# Patient Record
Sex: FEMALE | Race: WHITE | NOT HISPANIC OR LATINO | Employment: FULL TIME | URBAN - METROPOLITAN AREA
[De-identification: names, ages, dates, MRNs, and addresses within clinical notes are randomized per-mention and may not be internally consistent; named-entity substitution may affect disease eponyms.]

---
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
## 2020-01-02 ENCOUNTER — TELEPHONE (OUTPATIENT)
Dept: CARDIOLOGY CLINIC | Facility: CLINIC | Age: 20
End: 2020-01-02

## 2020-01-02 NOTE — TELEPHONE ENCOUNTER
It is not likely to happen from SSRI    She is under significant medical stress as she has to stop her ongoing studies  I think that is the more common precipitator      She should be evaluated by physical therapist who does the [facility] protocol  That will give me an idea as to where she is as far as exertional ability in the [facility] [address]    In addition should continue to see the counselors as this is a particularly stressful time where her future plans are going to be affected

## 2020-01-02 NOTE — TELEPHONE ENCOUNTER
Patient's mother called on stating patient has been having worsening symptoms of nausea/headaches, tachycardia with standing/brain fog  HR's when standing go from  bpm       She was asking if this could be from patient starting Zoloft 25mg three weeks ago  In the past, the patient took Lexapro and felt the same thing happened, but does feel that she needs something to help with anxiety/depression  She also does see a counselor  Are SSRI's ok for patient's who are being treated for POTS/dysautonaomia? If not, are there meds that are safer/better?

## 2020-01-03 NOTE — TELEPHONE ENCOUNTER
Explained to mother that SSRI should not be causing worsening in symptoms  I will call and speak to patient one on one this afternoon to triage her current symptoms

## 2020-01-06 NOTE — TELEPHONE ENCOUNTER
I am going to speak directly with patient about her symptoms  I called her today  She was working and will get back to me

## 2020-01-26 DIAGNOSIS — G90.9 AUTONOMIC DISORDER: ICD-10-CM

## 2020-02-06 RX ORDER — FLUDROCORTISONE ACETATE 0.1 MG/1
TABLET ORAL
Qty: 30 TABLET | Refills: 0 | Status: SHIPPED | OUTPATIENT
Start: 2020-02-06 | End: 2020-05-19

## 2020-05-19 DIAGNOSIS — G90.9 AUTONOMIC DISORDER: ICD-10-CM

## 2020-05-19 RX ORDER — FLUDROCORTISONE ACETATE 0.1 MG/1
TABLET ORAL
Qty: 30 TABLET | Refills: 0 | Status: SHIPPED | OUTPATIENT
Start: 2020-05-19

## 2020-07-20 ENCOUNTER — OFFICE VISIT (OUTPATIENT)
Dept: URGENT CARE | Facility: CLINIC | Age: 20
End: 2020-07-20
Payer: COMMERCIAL

## 2020-07-20 VITALS
DIASTOLIC BLOOD PRESSURE: 69 MMHG | HEIGHT: 68 IN | SYSTOLIC BLOOD PRESSURE: 108 MMHG | HEART RATE: 103 BPM | BODY MASS INDEX: 19.97 KG/M2 | TEMPERATURE: 98.2 F | RESPIRATION RATE: 16 BRPM | WEIGHT: 131.8 LBS | OXYGEN SATURATION: 98 %

## 2020-07-20 DIAGNOSIS — L23.7 ALLERGIC CONTACT DERMATITIS DUE TO PLANTS, EXCEPT FOOD: Primary | ICD-10-CM

## 2020-07-20 PROCEDURE — 99213 OFFICE O/P EST LOW 20 MIN: CPT | Performed by: PHYSICIAN ASSISTANT

## 2020-07-20 RX ORDER — NORTRIPTYLINE HYDROCHLORIDE 10 MG/1
CAPSULE ORAL
COMMUNITY

## 2020-07-20 RX ORDER — MIDODRINE HYDROCHLORIDE 2.5 MG/1
2.5 TABLET ORAL 3 TIMES DAILY
COMMUNITY

## 2020-07-20 RX ORDER — PREDNISONE 10 MG/1
TABLET ORAL
Qty: 21 TABLET | Refills: 0 | Status: SHIPPED | OUTPATIENT
Start: 2020-07-20

## 2020-07-20 NOTE — PROGRESS NOTES
[facility]        NAME: Mark Ellsworth is a 21 y o  female  : 2000    MRN: [id number]  DATE: 2020  TIME: 7:02 PM    Assessment and Plan   Allergic contact dermatitis due to plants, except food [L23 7]  1  Allergic contact dermatitis due to plants, except food  predniSONE 10 mg tablet         Patient Instructions     Use prednisone as directed  Monitor for signs of infection  Follow up with PCP in 3-5 days  Proceed to  ER if symptoms worsen  Chief Complaint     Chief Complaint   Patient presents with    Poison Ivy     Pt exposed to poison ivy 1 1/2 weeks ago  Rx'n improved, however reflared 20         History of Present Illness       Patient presents with complaint of poison ivy rash for the past week  Patient states that she has that a fair amount of time outdoors  She describes the rash as red, itchy, and with some blisters  She reports washing with a poison ivy scrub to help get the auras off and that applying topical hydrocortisone  She denies fever, chills, night sweats, purulent drainage from the rash, and other symptoms  Patient reports taking prednisone the past and tolerating it well  Review of Systems   Review of Systems   Constitutional: Negative for chills, fatigue and fever  Respiratory: Negative for cough, chest tightness, shortness of breath and wheezing  Cardiovascular: Negative for chest pain and palpitations  Musculoskeletal: Negative for myalgias  Skin: Positive for rash  Negative for color change and wound  Neurological: Negative for headaches  All other systems reviewed and are negative          Current Medications       Current Outpatient Medications:     midodrine (PROAMATINE) 2 5 mg tablet, Take 2 5 mg by mouth 3 (three) times a day, Disp: , Rfl:     nortriptyline (PAMELOR) 10 mg capsule, Take by mouth daily at bedtime, Disp: , Rfl:     escitalopram (LEXAPRO) 10 mg tablet, Take 10 mg by mouth daily, Disp: , Rfl:    fludrocortisone (FLORINEF) 0 1 mg tablet, TAKE 1 TABLET BY MOUTH EVERY DAY (Patient not taking: Reported on 7/20/2020), Disp: 30 tablet, Rfl: 0    Norethin-Eth Estrad-Fe Biphas (LO LOESTRIN FE) 1 MG-10 MCG / 10 MCG TABS, Take one pill daily, Disp: 84 tablet, Rfl: 4    predniSONE 10 mg tablet, Take 6 tabs on day 1, 5 tabs on day 2, 4 tabs on day 3, 3 tabs on day 4, 2 tabs on day 5, and 1 tab on day 6, Disp: 21 tablet, Rfl: 0    Current Allergies     Allergies as of 07/20/2020 - Reviewed 07/20/2020   Allergen Reaction Noted    Latex  10/27/2017    Penicillins  10/27/2017            The following portions of the patient's history were reviewed and updated as appropriate: allergies, current medications, past family history, past medical history, past social history, past surgical history and problem list      Past Medical History:   Diagnosis Date    Pott's disease        History reviewed  No pertinent surgical history  Family History   Problem Relation Age of Onset   [facility] Migraines Mother     Hypertension Father     Lung cancer Paternal Grandfather     Arthritis Family     Osteoporosis Family          Medications have been verified  Objective   /69 (BP Location: Right arm)   Pulse 103   Temp 98 2 °F (36 8 °C) (Tympanic)   Resp 16   Ht 5' 8" (1 727 m)   Wt 59 8 kg (131 lb 12 8 oz)   SpO2 98%   BMI 20 04 kg/m²        Physical Exam     Physical Exam   Constitutional: She is oriented to person, place, and time  She appears well-developed and well-nourished  No distress  HENT:   Head: Normocephalic and atraumatic  Eyes: Pupils are equal, round, and reactive to light  Conjunctivae and EOM are normal    Neck: Normal range of motion  Neck supple  Cardiovascular: Normal rate, regular rhythm and normal heart sounds  Pulmonary/Chest: Effort normal and breath sounds normal  No respiratory distress  Lymphadenopathy:     She has no cervical adenopathy     Neurological: She is alert and oriented to person, place, and time  No cranial nerve deficit or sensory deficit  Skin: Skin is warm and dry  Capillary refill takes less than 2 seconds  Rash noted  She is not diaphoretic  Scattered erythematous rash on bilateral upper and lower extremities and on neck; blanchable; nontender to palpation; no fluctuance or drainage; excoriations present; few scattered vesicles   Psychiatric: She has a normal mood and affect  Her behavior is normal  Thought content normal    Nursing note and vitals reviewed

## 2021-04-16 RX ORDER — NORETHINDRONE ACETATE AND ETHINYL ESTRADIOL, ETHINYL ESTRADIOL AND FERROUS FUMARATE 1MG-10(24)
KIT ORAL
Qty: 84 TABLET | Refills: 1 | OUTPATIENT
Start: 2021-04-16

## 2021-08-08 ENCOUNTER — OFFICE VISIT (OUTPATIENT)
Dept: URGENT CARE | Facility: CLINIC | Age: 21
End: 2021-08-08
Payer: COMMERCIAL

## 2021-08-08 VITALS
HEART RATE: 70 BPM | OXYGEN SATURATION: 96 % | DIASTOLIC BLOOD PRESSURE: 70 MMHG | TEMPERATURE: 97.6 F | SYSTOLIC BLOOD PRESSURE: 107 MMHG

## 2021-08-08 DIAGNOSIS — L23.7 POISON IVY: Primary | ICD-10-CM

## 2021-08-08 PROCEDURE — 99203 OFFICE O/P NEW LOW 30 MIN: CPT | Performed by: NURSE PRACTITIONER

## 2021-08-08 RX ORDER — METHYLPREDNISOLONE ACETATE 40 MG/ML
40 INJECTION, SUSPENSION INTRA-ARTICULAR; INTRALESIONAL; INTRAMUSCULAR; SOFT TISSUE ONCE
Status: COMPLETED | OUTPATIENT
Start: 2021-08-08 | End: 2021-08-08

## 2021-08-08 RX ADMIN — METHYLPREDNISOLONE ACETATE 40 MG: 40 INJECTION, SUSPENSION INTRA-ARTICULAR; INTRALESIONAL; INTRAMUSCULAR; SOFT TISSUE at 15:28

## 2021-08-08 NOTE — PATIENT INSTRUCTIONS
Poison Ivy   WHAT YOU NEED TO KNOW:   Poison ivy is a plant that can cause an itchy, uncomfortable rash on your skin  Poison ivy grows as a shrub or vine in woods, fields, and areas of thick [place]  It has 3 bright green leaves on each stem that turn red in dest[place]  DISCHARGE INSTRUCTIONS:   Medicines:   · Antiseptic or drying creams or ointments: These medicines may be used to dry out the rash and decrease the itching  These products may be available without a doctor's order  · Steroids: This medicine helps decrease itching and inflammation  It can be given as a cream to apply to your skin or as a pill  · Antihistamines: This medicine may help decrease itching and help you sleep  It is available without a doctor's order  · Take your medicine as directed  Contact your healthcare provider if you think your medicine is not helping or if you have side effects  Tell him or her if you are allergic to any medicine  Keep a list of the medicines, vitamins, and herbs you take  Include the amounts, and when and why you take them  Bring the list or the pill bottles to follow-up visits  Carry your medicine list with you in case of an emergency  Follow up with your healthcare provider as directed:  Write down your questions so you remember to ask them during your visits  How your poison ivy rash spreads: You cannot spread poison ivy by touching your rash or the liquid from your blisters  Poison ivy is spread only if you scratch your skin while it still has oil on it  You may think your rash is spreading because new rashes appear over a number of days  This happens because areas covered by thin skin break out in a rash first  Your face or forearms may develop a rash before thicker areas, such as the palms of your hands  Self-care:   · Keep your rash clean and dry:  Wash it with soap and water  Gently pat it dry with a clean towel  · Try not to scratch or rub your rash:   This can cause your skin to become infected  · Use a compress on your rash:  Dip a clean washcloth in cool water  Wring it out and place it on your rash  Leave the washcloth on your skin for 15 minutes  Do this at least 3 times per day  · Take a cornstarch or oatmeal bath: If your rash is too large to cover with wet washcloths, take 3 or 4 cornstarch baths daily  Mix 1 pound of cornstarch with a little water to make a paste  Add the paste to a tub full of water and mix well  You may also use colloidal oatmeal in the bath water  Use lukewarm water  Avoid hot water because it may cause your itching to increase  Prevent a poison ivy rash in the future:   · Wear skin protection:  Wear long pants, a long-sleeved shirt, and gloves  Use a skin block lotion to protect your skin from poison ivy oil  You can find this at a drugstore without a prescription  · Wash clothing after possible exposure: If you think you have been near a poison ivy plant, wash the clothes you were wearing separately from other clothes  Rinse the washing machine well after you take the clothes out  Scrub boots and shoes with warm, soapy water  Dry clean items and clothing that you cannot wash in water  Poison ivy oil is sticky and can stay on surfaces for a long time  It can cause a new rash even years later  · Bathe your pet:  Use warm water and shampoo on your pet's fur  This will prevent the spread of oil to your skin, car, and home  Wear long sleeves, long pants, and gloves while washing pets or any items that may have oil on them  · Reduce exposure to poison ivy:  Do not touch plants that look like poison ivy  Keep your yard free of poison ivy  While protecting your skin, remove the plant and the roots  Place them in a plastic bag and seal the bag tightly  · Do not burn poison ivy plants: This can spread the oil through the air  If you breathe the oil into your lungs, you could have swelling and serious breathing problems   Oil that clings to the fire joel can land on your skin and cause a rash  Contact your healthcare provider if:   · You have pus, soft yellow scabs, or tenderness on the rash  · The itching gets worse or keeps you awake at night  · The rash covers more than 1/4 of your skin or spreads to your eyes, mouth, or genital area  · The rash is not better after 2 to 3 weeks  · You have tender, swollen glands on the sides of your neck  · You have swelling in your arms and legs  · You have questions or concerns about your condition or care  Seek care immediately or call 911 if:   · You have a fever  · You have redness, swelling, and tenderness around the rash  · You have trouble breathing  © Copyright [employer] 2021 Information is for End User's use only and may not be sold, redistributed or otherwise used for commercial purposes  All illustrations and images included in CareNotes® are the copyrighted property of A D A M , Inc  or Hali Pruett   The above information is an  only  It is not intended as medical advice for individual conditions or treatments  Talk to your doctor, nurse or pharmacist before following any medical regimen to see if it is safe and effective for you

## 2021-08-08 NOTE — PROGRESS NOTES
[facility]        NAME: Clara Thompson is a 24 y o  female  : 2000    MRN: [id number]  DATE: 2021  TIME: 3:28 PM    Assessment and Plan   Poison ivy [L23 7]  1  Poison ivy  methylPREDNISolone acetate (DEPO-MEDROL) injection 40 mg     Discussed pills vs injection   Pt prefers injection -   Depo medrol given in office  Instructions provided  F/u with pcp prn   Pt in agreement with plan    Patient Instructions     Follow up with PCP in 3-5 days  Proceed to  ER if symptoms worsen  Chief Complaint     Chief Complaint   Patient presents with   [address]     pt was exposed to poison ivy Friday at work  Pt did an OTC poison ivy rinse & alcohol based itch relief spray  Started on her R arm, and is now spreading to b/l arms, neck, chin  History of Present Illness   Clara Thompson presents to the clinic c/o     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   Was exposed to poison during the week last week -   Used ivyrest wash and an antiseptic drying spray with no relief   Rash is continuing to spread  Arms, chest, neck, with rash noted  Review of Systems   Review of Systems   All other systems reviewed and are negative          Current Medications     Long-Term Medications   Medication Sig Dispense Refill    midodrine (PROAMATINE) 2 5 mg tablet Take 2 5 mg by mouth 3 (three) times a day      nortriptyline (PAMELOR) 10 mg capsule Take by mouth daily at bedtime      escitalopram (LEXAPRO) 10 mg tablet Take 10 mg by mouth daily      Norethin-Eth Estrad-Fe Biphas (LO LOESTRIN FE) 1 MG-10 MCG / 10 MCG TABS Take one pill daily 84 tablet 4       Current Allergies     Allergies as of 2021 - Reviewed 2021   Allergen Reaction Noted    Latex  10/27/2017    Penicillins  10/27/2017            The following portions of the patient's history were reviewed and updated as appropriate: allergies, current medications, past family history, past medical history, past social history, past surgical history and problem list     Objective   /70   Pulse 70   Temp 97 6 °F (36 4 °C)   SpO2 96%        Physical Exam     Physical Exam  Vitals and nursing note reviewed  Constitutional:       Appearance: She is well-developed  HENT:      Head: Normocephalic and atraumatic  Eyes:      General: Lids are normal       Conjunctiva/sclera: Conjunctivae normal    Cardiovascular:      Rate and Rhythm: Normal rate and regular rhythm  Heart sounds: Normal heart sounds, S1 normal and S2 normal    Pulmonary:      Effort: Pulmonary effort is normal       Breath sounds: Normal breath sounds  Skin:     General: Skin is warm and dry  Findings: Rash present  Rash is macular and papular  Neurological:      Mental Status: She is alert and oriented to person, place, and time  Psychiatric:         Speech: Speech normal          Behavior: Behavior normal  Behavior is cooperative  Thought Content:  Thought content normal          Judgment: Judgment normal

## 2021-11-04 ENCOUNTER — OFFICE VISIT (OUTPATIENT)
Dept: URGENT CARE | Facility: CLINIC | Age: 21
End: 2021-11-04
Payer: COMMERCIAL

## 2021-11-04 VITALS
HEART RATE: 102 BPM | TEMPERATURE: 98.8 F | OXYGEN SATURATION: 99 % | DIASTOLIC BLOOD PRESSURE: 72 MMHG | WEIGHT: 130 LBS | BODY MASS INDEX: 19.7 KG/M2 | HEIGHT: 68 IN | SYSTOLIC BLOOD PRESSURE: 114 MMHG | RESPIRATION RATE: 18 BRPM

## 2021-11-04 DIAGNOSIS — L03.221 CELLULITIS, NECK: ICD-10-CM

## 2021-11-04 DIAGNOSIS — L23.7 ALLERGIC CONTACT DERMATITIS DUE TO PLANTS, EXCEPT FOOD: Primary | ICD-10-CM

## 2021-11-04 PROCEDURE — 99213 OFFICE O/P EST LOW 20 MIN: CPT | Performed by: PHYSICIAN ASSISTANT

## 2021-11-04 RX ORDER — CEPHALEXIN 500 MG/1
500 CAPSULE ORAL EVERY 6 HOURS SCHEDULED
Qty: 20 CAPSULE | Refills: 0 | Status: SHIPPED | OUTPATIENT
Start: 2021-11-04 | End: 2021-11-09

## 2021-11-04 RX ORDER — PREDNISONE 10 MG/1
TABLET ORAL
Qty: 21 TABLET | Refills: 0 | Status: SHIPPED | OUTPATIENT
Start: 2021-11-04

## 2021-12-28 ENCOUNTER — NURSE TRIAGE (OUTPATIENT)
Dept: OTHER | Facility: OTHER | Age: 21
End: 2021-12-28

## 2021-12-28 DIAGNOSIS — Z20.828 EXPOSURE TO SARS-ASSOCIATED CORONAVIRUS: Primary | ICD-10-CM

## 2021-12-28 PROCEDURE — 87636 SARSCOV2 & INF A&B AMP PRB: CPT | Performed by: FAMILY MEDICINE

## 2022-11-15 ENCOUNTER — OFFICE VISIT (OUTPATIENT)
Dept: GYNECOLOGY | Facility: CLINIC | Age: 22
End: 2022-11-15

## 2022-11-15 VITALS
WEIGHT: 139 LBS | HEIGHT: 68 IN | DIASTOLIC BLOOD PRESSURE: 72 MMHG | BODY MASS INDEX: 21.07 KG/M2 | SYSTOLIC BLOOD PRESSURE: 120 MMHG

## 2022-11-15 DIAGNOSIS — Z01.419 WOMEN'S ANNUAL ROUTINE GYNECOLOGICAL EXAMINATION: ICD-10-CM

## 2022-11-15 DIAGNOSIS — N64.4 BREAST PAIN, LEFT: ICD-10-CM

## 2022-11-15 DIAGNOSIS — Z11.3 SCREENING FOR STD (SEXUALLY TRANSMITTED DISEASE): ICD-10-CM

## 2022-11-15 DIAGNOSIS — N94.6 DYSMENORRHEA: ICD-10-CM

## 2022-11-15 DIAGNOSIS — R92.2 DENSE BREAST TISSUE: ICD-10-CM

## 2022-11-15 DIAGNOSIS — Z12.4 SCREENING FOR CERVICAL CANCER: ICD-10-CM

## 2022-11-15 RX ORDER — NORETHINDRONE ACETATE AND ETHINYL ESTRADIOL AND FERROUS FUMARATE 1MG-20(24)
1 KIT ORAL DAILY
Qty: 84 TABLET | Refills: 3 | Status: SHIPPED | OUTPATIENT
Start: 2022-11-15 | End: 2023-02-07

## 2022-11-15 NOTE — PROGRESS NOTES
Assessment     Annual well-woman exam    Left breast pain    Contraceptive management    History of dysmenorrhea    Screening for STDs    History of Pott's disease/ syndrome    History of anxiety/depression, resolved, off all meds        Plan      Ultrasound left breast     Reviewed self-breast exam techniques     Renewed OCPs     Initial Pap completed     Follow-up 1 year p r n  All questions answered  Await pap smear results  Social history single,  Works as an , sexual partner male/female       She does not smoke cigarettes, she does admit to social alcohol consumption 1 or 2      drinks per week, denies recreational drugs    Subjective  Here for annual exam     Yonis Baker is a 25 y o  female who presents for annual exam  She was previously taking multiple medications for anxiety and depression which she has stopped  She feels well not having any issues or concerns at this time  She has concern with left breast pain and fullness which she 1st noticed around 3 weeks ago  She is uncertain whether the it is cyclic in nature or so sitter with her menstrual cycle  She will keep a better track  Recommend screening ultrasound of her left breast  She will continue her OCPs  Denies any breakthrough bleeding denies any unusual discharge odor or pain  Periods are regular every 28-30 days, lasting 5 days  Dysmenorrhea:mild, occurring throughout menses  Cyclic symptoms include breast tenderness  No intermenstrual bleeding, spotting, or discharge  The patient reports that there is not domestic violence in her life  Current contraception: OCP (estrogen/progesterone)  History of abnormal Pap smear: no  Family history of uterine or ovarian cancer:   Maternal grandmother with ovarian cancer, BRCA 1 Negative  Regular self breast exam: yes  History of abnormal mammogram:   Family history of breast cancer: no  History of abnormal lipids:  unknown  Menstrual History:no  OB History        0 Para   0    Term   0       0    AB   0    Living   0       SAB   0    IAB   0    Ectopic   0    Multiple   0    Live Births   0                Menarche age: 15  Patient's last menstrual period was 10/17/2022 (exact date)  Review of Systems  Pertinent items are noted in HPI  Objective  No acute distress   /72 (BP Location: Right arm, Patient Position: Sitting, Cuff Size: Standard)   Ht 5' 7 5" (1 715 m)   Wt 63 kg (139 lb)   LMP 10/17/2022 (Exact Date)   BMI 21 45 kg/m²     General:   alert and oriented, in no acute distress, alert, appears stated age and cooperative   Heart: regular rate and rhythm, S1, S2 normal, no murmur, click, rub or gallop   Lungs: clear to auscultation bilaterally   Abdomen: soft, non-tender, without masses or organomegaly   Vulva: normal, Bartholin's, Urethra, Marvel's normal, female escutcheon   Vagina: normal mucosa, normal discharge, no palpable nodules   Cervix: anteverted, no bleeding following Pap, no cervical motion tenderness, no lesions, nulliparous appearance and retroverted   Uterus: normal size, mobile, non-tender, normal shape and consistency, retroverted   Adnexa: normal adnexa and no mass, fullness, tenderness   Bilateral breast exam in the sitting and supine position with chaperone present, no visible or palpable breast lesions identified  No breast masses noted  No supraclavicular or axillary lymphadenopathy noted  No nipple discharge  Reviewed self-breast exam techniques     Rectal exam,  deferred

## 2022-11-17 LAB
C TRACH DNA SPEC QL NAA+PROBE: NEGATIVE
N GONORRHOEA DNA SPEC QL NAA+PROBE: NEGATIVE

## 2022-11-18 LAB
LAB AP GYN PRIMARY INTERPRETATION: NORMAL
LAB AP LMP: NORMAL
Lab: NORMAL

## 2022-12-12 ENCOUNTER — HOSPITAL ENCOUNTER (OUTPATIENT)
Dept: ULTRASOUND IMAGING | Facility: CLINIC | Age: 22
Discharge: HOME | End: 2022-12-12

## 2022-12-12 DIAGNOSIS — N64.4 BREAST PAIN, LEFT: ICD-10-CM

## 2022-12-12 DIAGNOSIS — R92.2 DENSE BREAST TISSUE: ICD-10-CM

## 2022-12-28 ENCOUNTER — OFFICE VISIT (OUTPATIENT)
Dept: URGENT CARE | Facility: CLINIC | Age: 22
End: 2022-12-28

## 2022-12-28 VITALS
HEIGHT: 68 IN | OXYGEN SATURATION: 99 % | HEART RATE: 76 BPM | RESPIRATION RATE: 18 BRPM | TEMPERATURE: 98.4 F | WEIGHT: 135 LBS | BODY MASS INDEX: 20.46 KG/M2

## 2022-12-28 DIAGNOSIS — L03.011 CELLULITIS OF RIGHT MIDDLE FINGER: Primary | ICD-10-CM

## 2022-12-28 RX ORDER — SULFAMETHOXAZOLE AND TRIMETHOPRIM 800; 160 MG/1; MG/1
1 TABLET ORAL EVERY 12 HOURS SCHEDULED
Qty: 14 TABLET | Refills: 0 | Status: SHIPPED | OUTPATIENT
Start: 2022-12-28 | End: 2023-01-04

## 2022-12-28 NOTE — PROGRESS NOTES
[facility]        NAME: Kai Hartman is a 25 y o  female  : 2000    MRN: [id number]  DATE: 2022  TIME: 4:22 PM      Assessment and Plan     Cellulitis of right middle finger [L03 011]  1  Cellulitis of right middle finger  sulfamethoxazole-trimethoprim (BACTRIM DS) 800-160 mg per tablet            Patient Instructions     Take antibiotic as prescribed  Recommend probiotic use while taking antibiotic  Warm compresses as needed  Acetaminophen or ibuprofen for fever and pain  Follow-up with PCP in 3-5 days  Go to ER if symptoms worsen  Chief Complaint     Chief Complaint   Patient presents with   • Finger Swelling     Finger Swelling - Right Middle Finger    1 month ago, Pt had a splinter in her right middle finger from a glenn tree  She states the thorn came out; however, she thinks the tip still may be in there  Right middle finger is warm to touch, swollen, and is blanchable  Cap refill to right middle finger is normal  Last Tdap 2022  History of Present Illness     Patient is a 20-year-old female who presents with swelling and redness to right middle finger over the past few days  States she did initially puncture with a splinter approximately 1 month ago  States the area was resolving and then got worse again  States that she feels like the area is coming to ahead  Denies fever or chills  Denies nausea, vomiting, or diarrhea  Denies chance of pregnancy  Review of Systems     Review of Systems   Constitutional: Negative for chills and fever  Gastrointestinal: Negative for diarrhea, nausea and vomiting  Musculoskeletal: Positive for joint swelling  Skin: Positive for color change  All other systems reviewed and are negative          Current Medications       Current Outpatient Medications:   •  norethindrone-ethinyl estradiol-ferrous fumarate (Junel Fe 24) 1-20 MG-MCG(24) per tablet, Take 1 tablet by mouth daily, Disp: 84 tablet, Rfl: 3  •  sulfamethoxazole-trimethoprim (BACTRIM DS) 800-160 mg per tablet, Take 1 tablet by mouth every 12 (twelve) hours for 7 days, Disp: 14 tablet, Rfl: 0  •  escitalopram (LEXAPRO) 10 mg tablet, Take 10 mg by mouth daily (Patient not taking: Reported on 11/4/2021), Disp: , Rfl:   •  fludrocortisone (FLORINEF) 0 1 mg tablet, TAKE 1 TABLET BY MOUTH EVERY DAY (Patient not taking: No sig reported), Disp: 30 tablet, Rfl: 0  •  midodrine (PROAMATINE) 2 5 mg tablet, Take 2 5 mg by mouth 3 (three) times a day (Patient not taking: Reported on 11/4/2021), Disp: , Rfl:   •  nortriptyline (PAMELOR) 10 mg capsule, Take by mouth daily at bedtime (Patient not taking: Reported on 11/4/2021), Disp: , Rfl:   •  predniSONE 10 mg tablet, Take 6 tabs on day 1, 5 tabs on day 2, 4 tabs on day 3, 3 tabs on day 4, 2 tabs on day 5, and 1 tab on day 6 (Patient not taking: Reported on 11/4/2021), Disp: 21 tablet, Rfl: 0  •  predniSONE 10 mg tablet, Take 6 tabs on day 1, 5 tabs on day 2, 4 tabs on day 3, 3 tabs on day 4, 2 tabs on day 5, and 1 tab on day 6 (Patient not taking: Reported on 11/15/2022), Disp: 21 tablet, Rfl: 0    Current Allergies     Allergies as of 12/28/2022 - Reviewed 12/28/2022   Allergen Reaction Noted   • Latex  10/27/2017   • Penicillins  10/27/2017              The following portions of the patient's history were reviewed and updated as appropriate: allergies, current medications, past family history, past medical history, past social history, past surgical history and problem list      Past Medical History:   Diagnosis Date   • Depression    • Pott's disease        History reviewed  No pertinent surgical history      Family History   Problem Relation Age of Onset   • No Known Problems Mother    • Asthma Father    • Hypertension Father    • Ovarian cancer Maternal Grandmother    • Lung cancer Paternal Grandfather    • Asthma Brother    • Arthritis Family    • Osteoporosis Family    • No Known Problems Maternal Aunt Medications have been verified  Objective     Pulse 76   Temp 98 4 °F (36 9 °C) (Tympanic)   Resp 18   Ht 5' 8" (1 727 m)   Wt 61 2 kg (135 lb)   LMP 12/14/2022 (Exact Date)   SpO2 99%   BMI 20 53 kg/m²   Patient's last menstrual period was 12/14/2022 (exact date)  Physical Exam     Physical Exam  Vitals and nursing note reviewed  Constitutional:       General: She is awake  She is not in acute distress  Appearance: Normal appearance  She is not ill-appearing, toxic-appearing or diaphoretic  Cardiovascular:      Rate and Rhythm: Normal rate  Pulses: Normal pulses  Heart sounds: Normal heart sounds, S1 normal and S2 normal    Pulmonary:      Effort: Pulmonary effort is normal       Breath sounds: Normal breath sounds and air entry  Musculoskeletal:      Right hand: Swelling and tenderness present  No bony tenderness  Normal range of motion  Normal sensation  Normal capillary refill  Normal pulse  Hands:    Skin:     General: Skin is warm  Capillary Refill: Capillary refill takes less than 2 seconds  Neurological:      Mental Status: She is alert  Psychiatric:         Mood and Affect: Mood normal          Behavior: Behavior normal          Thought Content:  Thought content normal          Judgment: Judgment normal

## 2022-12-28 NOTE — PATIENT INSTRUCTIONS
Take antibiotic as prescribed  Recommend probiotic use while taking antibiotic  Warm compresses as needed  Acetaminophen or ibuprofen for fever and pain  Follow-up with PCP in 3-5 days  Go to ER if symptoms worsen

## 2023-01-30 ENCOUNTER — TELEPHONE (OUTPATIENT)
Dept: NEUROLOGY | Facility: CLINIC | Age: 23
End: 2023-01-30

## 2023-01-30 NOTE — TELEPHONE ENCOUNTER
Received request for medical records from [facility]  Request scanned into  and faxed to [phone]

## 2023-03-14 ENCOUNTER — TELEPHONE (OUTPATIENT)
Dept: CARDIOLOGY CLINIC | Facility: CLINIC | Age: 23
End: 2023-03-14

## 2023-03-14 NOTE — TELEPHONE ENCOUNTER
Hazel, my name is Arlene Preston  I'm a former patient of Doctor Hu Jones and I'm starting a new job  And because I have a pop diagnosis, I just need a letter with his signature and some very specific verbiage clearing me for my job before I can  I'm approved for my start date, so if you could just give me a call back, I could give you the details of what needs to be in the note and answer any questions that you have before you write me the note  That'd be awesome  Thank you  Have a nice day

## 2023-03-20 ENCOUNTER — OFFICE VISIT (OUTPATIENT)
Dept: CARDIOLOGY CLINIC | Facility: CLINIC | Age: 23
End: 2023-03-20

## 2023-03-20 VITALS
HEIGHT: 68 IN | SYSTOLIC BLOOD PRESSURE: 106 MMHG | HEART RATE: 84 BPM | WEIGHT: 140 LBS | DIASTOLIC BLOOD PRESSURE: 64 MMHG | BODY MASS INDEX: 21.22 KG/M2

## 2023-03-20 DIAGNOSIS — G90.A POTS (POSTURAL ORTHOSTATIC TACHYCARDIA SYNDROME): Primary | ICD-10-CM

## 2023-03-20 NOTE — PROGRESS NOTES
Cardiology Follow Up    Melissa Fam  2000  [id number]  [address]  [facility] [address]  [address] [facility] [address] [phone]    1  POTS (postural orthostatic tachycardia syndrome)  POCT ECG          Interval History: Followup for POTS    She has no chest pain, dyspnea or palpitations  She has no chest pain, dyspnea or lightheadedness  She is well hydrated and keeps her salt intake up  Overall she has done well  She has not had syncope in over two years  Medical Problems     Problem List     POTS (postural orthostatic tachycardia syndrome)    Exertional headache    Autonomic disorder    Headache    Joint laxity        Past Medical History:   Diagnosis Date   • Depression    • Pott's disease      Social History     Socioeconomic History   • Marital status: Single     Spouse name: Not on file   • Number of children: Not on file   • Years of education: Not on file   • Highest education level: Not on file   Occupational History   • Not on file   Tobacco Use   • Smoking status: Never   • Smokeless tobacco: Never   Vaping Use   • Vaping Use: Never used   Substance and Sexual Activity   • Alcohol use:  Yes   • Drug use: No   • Sexual activity: Yes     Partners: Male, Female     Birth control/protection: OCP     Comment:  bi-sexual   Other Topics Concern   • Not on file   Social History Narrative    Caffeine use    Lives with family     Social Determinants of Health     Financial Resource Strain: Not on file   Food Insecurity: Not on file   Transportation Needs: Not on file   Physical Activity: Not on file   Stress: Not on file   Social Connections: Not on file   Intimate Partner Violence: Not on file   Housing Stability: Not on file      Family History   Problem Relation Age of Onset   • No Known Problems Mother    • Asthma Father    • Hypertension Father    • Ovarian cancer Maternal Grandmother    • Lung cancer Paternal Grandfather    • Asthma Brother    • Arthritis Family    • Osteoporosis Family    • No Known Problems Maternal Aunt      No past surgical history on file  Current Outpatient Medications:   •  norethindrone-ethinyl estradiol-ferrous fumarate (Junel Fe 24) 1-20 MG-MCG(24) per tablet, Take 1 tablet by mouth daily, Disp: 84 tablet, Rfl: 3  Allergies   Allergen Reactions   • Latex    • Penicillins        Labs:     Chemistry        Component Value Date/Time     07/14/2015 1655    K 4 1 07/14/2015 1655     07/14/2015 1655    CO2 29 07/14/2015 1655    BUN 12 07/14/2015 1655    CREATININE 0 47 (L) 07/14/2015 1655        Component Value Date/Time    CALCIUM 8 3 07/14/2015 1655            No results found for: CHOL  No results found for: HDL  No results found for: LDLCALC  No results found for: TRIG  No results found for: CHOLHDL    Imaging: No results found  EKG: NSR Normal ECG    Review of Systems   Constitutional: Negative  HENT: Negative  Eyes: Negative  Cardiovascular: Negative  Respiratory: Negative  Endocrine: Negative  Hematologic/Lymphatic: Negative  Skin: Negative  Musculoskeletal: Negative  Gastrointestinal: Negative  Genitourinary: Negative  Neurological: Negative  Psychiatric/Behavioral: Negative  Allergic/Immunologic: Negative  Vitals:    03/20/23 1033   BP: 106/64   Pulse: 84           Physical Exam  Vitals and nursing note reviewed  Constitutional:       Appearance: Normal appearance  HENT:      Head: Normocephalic  Nose: Nose normal       Mouth/Throat:      Mouth: Mucous membranes are moist    Eyes:      General: No scleral icterus  Conjunctiva/sclera: Conjunctivae normal    Cardiovascular:      Rate and Rhythm: Normal rate and regular rhythm  Heart sounds: No murmur heard  No gallop  Pulmonary:      Effort: Pulmonary effort is normal  No respiratory distress  Breath sounds: Normal breath sounds  No wheezing or rales  Abdominal:      General: Abdomen is flat  Bowel sounds are normal  There is no distension  Palpations: Abdomen is soft  Tenderness: There is no abdominal tenderness  There is no guarding  Musculoskeletal:      Cervical back: Normal range of motion and neck supple  Right lower leg: No edema  Left lower leg: No edema  Skin:     General: Skin is warm and dry  Neurological:      General: No focal deficit present  Mental Status: She is alert and oriented to person, place, and time  Psychiatric:         Mood and Affect: Mood normal          Behavior: Behavior normal          Discussion/Summary:    POTs: 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

## 2023-03-28 ENCOUNTER — TELEPHONE (OUTPATIENT)
Dept: CARDIOLOGY CLINIC | Facility: CLINIC | Age: 23
End: 2023-03-28

## 2023-03-28 NOTE — TELEPHONE ENCOUNTER
Hi, my name is Kamille Ojeda  My birthday is [birth year]  I'm a patient of Doctor Nathanael Chandler and I am in need of a form filled out for a dot physical  I guess my question is, can I send this to you electronically or do I need to make an appointment? Give me a call back that you're awesome  Thank you   Bye

## 2023-03-29 NOTE — TELEPHONE ENCOUNTER
LMVM with fax number  Mentioned pt could upload to [employer] or bring in office as well  Pt was just seen and doesn't need another appt

## 2023-06-05 ENCOUNTER — TELEPHONE (OUTPATIENT)
Dept: DERMATOLOGY | Facility: CLINIC | Age: 23
End: 2023-06-05

## 2023-06-06 ENCOUNTER — OFFICE VISIT (OUTPATIENT)
Dept: URGENT CARE | Facility: CLINIC | Age: 23
End: 2023-06-06
Payer: COMMERCIAL

## 2023-06-06 VITALS
RESPIRATION RATE: 16 BRPM | BODY MASS INDEX: 20.61 KG/M2 | HEIGHT: 68 IN | SYSTOLIC BLOOD PRESSURE: 96 MMHG | DIASTOLIC BLOOD PRESSURE: 58 MMHG | TEMPERATURE: 97 F | HEART RATE: 78 BPM | OXYGEN SATURATION: 99 % | WEIGHT: 136 LBS

## 2023-06-06 DIAGNOSIS — H05.012 ORBITAL CELLULITIS ON LEFT: Primary | ICD-10-CM

## 2023-06-06 PROCEDURE — 99213 OFFICE O/P EST LOW 20 MIN: CPT

## 2023-06-06 RX ORDER — CLINDAMYCIN HYDROCHLORIDE 300 MG/1
300 CAPSULE ORAL 4 TIMES DAILY
Qty: 20 CAPSULE | Refills: 0 | Status: SHIPPED | OUTPATIENT
Start: 2023-06-06 | End: 2023-06-11

## 2023-06-06 RX ORDER — OFLOXACIN 3 MG/ML
1 SOLUTION/ DROPS OPHTHALMIC 4 TIMES DAILY
Qty: 2 ML | Refills: 0 | Status: SHIPPED | OUTPATIENT
Start: 2023-06-06 | End: 2023-06-11

## 2023-06-06 NOTE — PROGRESS NOTES
[facility]        NAME: Katelin Maldonado is a 21 y o  female  : 2000    MRN: [id number]  DATE: 2023  TIME: 9:08 AM    Assessment and Plan   Orbital cellulitis on left [H05 012]  1  Orbital cellulitis on left  clindamycin (CLEOCIN) 300 MG capsule    ofloxacin (OCUFLOX) 0 3 % ophthalmic solution            Patient Instructions   - Take ABX as directed  - Continue warm compresses's  - Tylenol or Motrin as needed   Follow up with PCP in 3-5 days  Proceed to  ER if symptoms worsen or for any concerns  Chief Complaint     Chief Complaint   Patient presents with   • Facial Swelling     PT presents with left upper eyelid swelling x 4  days, with last 2 days being very swollen with discharge that she describes as off white  History of Present Illness       22 y/o F presents for L eye lid swelling x 4 days  Increasing in size  No relief with warm compress  White discharge  Wears contacts  Last used two days ago  Denies trauma  Review of Systems   Review of Systems   Constitutional: Negative for chills and fever  Eyes: Positive for pain and discharge  Negative for redness and visual disturbance           Current Medications       Current Outpatient Medications:   •  clindamycin (CLEOCIN) 300 MG capsule, Take 1 capsule (300 mg total) by mouth 4 (four) times a day for 5 days, Disp: 20 capsule, Rfl: 0  •  norethindrone-ethinyl estradiol-ferrous fumarate (Junel Fe 24) 1-20 MG-MCG(24) per tablet, Take 1 tablet by mouth daily, Disp: 84 tablet, Rfl: 3  •  ofloxacin (OCUFLOX) 0 3 % ophthalmic solution, Administer 1 drop into the left eye 4 (four) times a day for 5 days, Disp: 2 mL, Rfl: 0    Current Allergies     Allergies as of 2023 - Reviewed 2023   Allergen Reaction Noted   • Latex  10/27/2017   • Penicillins  10/27/2017            The following portions of the patient's history were reviewed and updated as appropriate: allergies, current medications, past family "history, past medical history, past social history, past surgical history and problem list      Past Medical History:   Diagnosis Date   • Depression    • Pott's disease        History reviewed  No pertinent surgical history  Family History   Problem Relation Age of Onset   • No Known Problems Mother    • Asthma Father    • Hypertension Father    • Ovarian cancer Maternal Grandmother    • Lung cancer Paternal Grandfather    • Asthma Brother    • Arthritis Family    • Osteoporosis Family    • No Known Problems Maternal Aunt          Medications have been verified  Objective   BP 96/58   Pulse 78   Temp (!) 97 °F (36 1 °C)   Resp 16   Ht 5' 8\" (1 727 m)   Wt 61 7 kg (136 lb)   SpO2 99%   BMI 20 68 kg/m²   No LMP recorded  Physical Exam     Physical Exam  Vitals and nursing note reviewed  Constitutional:       General: She is not in acute distress  Appearance: She is not toxic-appearing  HENT:      Head: Normocephalic and atraumatic  Right Ear: Tympanic membrane, ear canal and external ear normal       Left Ear: Tympanic membrane, ear canal and external ear normal       Nose: Nose normal       Mouth/Throat:      Mouth: Mucous membranes are moist       Pharynx: No oropharyngeal exudate or posterior oropharyngeal erythema  Eyes:      Extraocular Movements: Extraocular movements intact  Conjunctiva/sclera: Conjunctivae normal       Pupils: Pupils are equal, round, and reactive to light  Comments: L eye swelling  No overlying erythema  No pain with EOM  Pain to palpation of orbit  No purulence expressed    Pulmonary:      Effort: Pulmonary effort is normal    Musculoskeletal:      Cervical back: No tenderness  Lymphadenopathy:      Cervical: No cervical adenopathy  Neurological:      Mental Status: She is alert     Psychiatric:         Mood and Affect: Mood normal          Behavior: Behavior normal                    "

## 2023-11-02 ENCOUNTER — OFFICE VISIT (OUTPATIENT)
Dept: URGENT CARE | Facility: CLINIC | Age: 23
End: 2023-11-02
Payer: COMMERCIAL

## 2023-11-02 VITALS
DIASTOLIC BLOOD PRESSURE: 62 MMHG | TEMPERATURE: 98.1 F | HEART RATE: 85 BPM | SYSTOLIC BLOOD PRESSURE: 107 MMHG | RESPIRATION RATE: 18 BRPM | OXYGEN SATURATION: 99 %

## 2023-11-02 DIAGNOSIS — H18.822 CORNEAL ABRASION OF LEFT EYE DUE TO CONTACT LENS: Primary | ICD-10-CM

## 2023-11-02 PROCEDURE — 99213 OFFICE O/P EST LOW 20 MIN: CPT | Performed by: PHYSICIAN ASSISTANT

## 2023-11-02 RX ORDER — CIPROFLOXACIN HYDROCHLORIDE 3.5 MG/ML
1 SOLUTION/ DROPS TOPICAL
Qty: 5 ML | Refills: 0 | Status: SHIPPED | OUTPATIENT
Start: 2023-11-02 | End: 2023-11-09

## 2023-11-02 RX ORDER — ONDANSETRON 4 MG/1
TABLET, ORALLY DISINTEGRATING ORAL
COMMUNITY
Start: 2023-09-24

## 2023-11-02 NOTE — PROGRESS NOTES
[facility]        NAME: Tamiko Cooper is a 21 y.o. female  : 2000    MRN: [id number]  DATE: 2023  TIME: 12:21 PM    /62   Pulse 85   Temp 98.1 °F (36.7 °C)   Resp 18   SpO2 99%     Assessment and Plan   Corneal abrasion of left eye due to contact lens [H18.822]  1. Corneal abrasion of left eye due to contact lens  ciprofloxacin (CILOXAN) 0.3 % ophthalmic solution            Patient Instructions       Follow up with PCP in 3-5 days. Proceed to  ER if symptoms worsen. Chief Complaint     Chief Complaint   Patient presents with    Eye Pain     Pt reports left eye pain since removing her contact lenses last night w/ severe photosensitivity. History of Present Illness       Pt with left eye irritation ,  problem when taking contact lens out last conner , pt with photophobia and redness of left eye     Eye Pain   Associated symptoms include eye redness and photophobia. Review of Systems   Review of Systems   Constitutional: Negative. HENT: Negative. Eyes:  Positive for photophobia and redness. Respiratory: Negative. Cardiovascular: Negative. Gastrointestinal: Negative. Endocrine: Negative. Genitourinary: Negative. Musculoskeletal: Negative. Skin: Negative. Allergic/Immunologic: Negative. Neurological: Negative. Hematological: Negative. Psychiatric/Behavioral: Negative. All other systems reviewed and are negative.         Current Medications       Current Outpatient Medications:     ciprofloxacin (CILOXAN) 0.3 % ophthalmic solution, Administer 1 drop into the left eye every 2 (two) hours for 7 days, Disp: 5 mL, Rfl: 0    ondansetron (ZOFRAN-ODT) 4 mg disintegrating tablet, TAKE 2 TABS BY MOUTH TWICE DAILY AS NEEDED FOR NAUSEA AND VOMITTING, Disp: , Rfl:     norethindrone-ethinyl estradiol-ferrous fumarate (Junel Fe 24) 1-20 MG-MCG(24) per tablet, Take 1 tablet by mouth daily, Disp: 84 tablet, Rfl: 3    Current Allergies Allergies as of 11/02/2023 - Reviewed 11/02/2023   Allergen Reaction Noted    Latex  10/27/2017    Penicillins  10/27/2017            The following portions of the patient's history were reviewed and updated as appropriate: allergies, current medications, past family history, past medical history, past social history, past surgical history and problem list.     Past Medical History:   Diagnosis Date    Depression     Pott's disease        History reviewed. No pertinent surgical history. Family History   Problem Relation Age of Onset    No Known Problems Mother     Asthma Father     Hypertension Father     Ovarian cancer Maternal Grandmother     Lung cancer Paternal Grandfather     Asthma Brother     Arthritis Family     Osteoporosis Family     No Known Problems Maternal Aunt          Medications have been verified. Objective   /62   Pulse 85   Temp 98.1 °F (36.7 °C)   Resp 18   SpO2 99%        Physical Exam     Physical Exam  Vitals and nursing note reviewed. Constitutional:       Appearance: Normal appearance. She is normal weight. HENT:      Head: Normocephalic and atraumatic. Right Ear: Tympanic membrane, ear canal and external ear normal.      Left Ear: Tympanic membrane, ear canal and external ear normal.   Eyes:      Extraocular Movements: Extraocular movements intact. Pupils: Pupils are equal, round, and reactive to light. Comments: Left conj injected   + red reflex anterior chamber wnl,  + superficial fluorosceine uptake superior medial  no f/o under either lid, lids wnl, perrl eom wnl , no grey area no corneal ulcer seen     Left 20/25 right 20/25 with glasses    Neurological:      Mental Status: She is alert.

## 2023-12-11 ENCOUNTER — OFFICE VISIT (OUTPATIENT)
Dept: URGENT CARE | Facility: CLINIC | Age: 23
End: 2023-12-11
Payer: COMMERCIAL

## 2023-12-11 VITALS
RESPIRATION RATE: 16 BRPM | OXYGEN SATURATION: 98 % | HEART RATE: 89 BPM | SYSTOLIC BLOOD PRESSURE: 99 MMHG | TEMPERATURE: 98.6 F | DIASTOLIC BLOOD PRESSURE: 58 MMHG

## 2023-12-11 DIAGNOSIS — M54.6 ACUTE LEFT-SIDED THORACIC BACK PAIN: Primary | ICD-10-CM

## 2023-12-11 DIAGNOSIS — M62.830 MUSCLE SPASM OF BACK: ICD-10-CM

## 2023-12-11 PROCEDURE — 99213 OFFICE O/P EST LOW 20 MIN: CPT

## 2023-12-11 RX ORDER — METHOCARBAMOL 500 MG/1
500 TABLET, FILM COATED ORAL 3 TIMES DAILY
Qty: 21 TABLET | Refills: 0 | Status: SHIPPED | OUTPATIENT
Start: 2023-12-11

## 2023-12-11 RX ORDER — NAPROXEN 500 MG/1
500 TABLET ORAL 2 TIMES DAILY WITH MEALS
Qty: 30 TABLET | Refills: 0 | Status: SHIPPED | OUTPATIENT
Start: 2023-12-11

## 2023-12-11 RX ORDER — CYCLOBENZAPRINE HCL 5 MG
5 TABLET ORAL 3 TIMES DAILY PRN
Qty: 21 TABLET | Refills: 0 | Status: SHIPPED | OUTPATIENT
Start: 2023-12-11 | End: 2023-12-11 | Stop reason: CLARIF

## 2023-12-11 NOTE — LETTER
December 11, 2023     Patient: Wojciech Dominguez   YOB: 2000   Date of Visit: 12/11/2023       To Whom it May Concern:    Yecenia Hathaway was seen in my clinic on 12/11/2023. She may return to work on 12/12/2023 . If you have any questions or concerns, please don't hesitate to call.          Sincerely,          Shannan Mcdonald PA-C        CC: No Recipients

## 2023-12-11 NOTE — PROGRESS NOTES
[facility]        NAME: Nadia Brito is a 21 y.o. female  : 2000    MRN: [id number]  DATE: 2023  TIME: 3:58 PM    Assessment and Plan   Acute left-sided thoracic back pain [M54.6]  1. Acute left-sided thoracic back pain  naproxen (Naprosyn) 500 mg tablet      2. Muscle spasm of back  methocarbamol (ROBAXIN) 500 mg tablet    DISCONTINUED: cyclobenzaprine (FLEXERIL) 5 mg tablet            Patient Instructions     Start naproxen as prescribed  Start robaxin as prescribed  Ice 20 minutes 3-4 times per day  Insulate the skin from the ice to prevent frostbite  Follow up with orthopedic if symptoms do not improve  Follow up with PCP in 3-5 days. Proceed to  ER if symptoms worsen. Chief Complaint     Chief Complaint   Patient presents with    Back Pain     Pt reports mid back pain to the left side x1 week. She tripped over her dog on the stairs and tweaked it when she grabbed the railing. History of Present Illness       Patient is a 20 yo female with no significant PMH presenting in the clinic today for back pain x 1 week. Patient notes 1 week ago she was descending the her 8 step staircase when she slipped down about 4 steps and grabbed the hand rail for support. She states at that time she "felt like something was wrong with my back after". Denies head strike and LOC. Patient locates their pain to the left sided mid back. Admits pain is exacerbated with deep inhalations, left sided thoracolumbar rotation, and scapular protraction. Denies fever, chills, numbness, tingling, swelling, bruising, erythema, warmth, chest pain, and SOB. Admits the use of Aleve and Advil for symptom management. Patient notes prior L5-S1 "slipped disc" about 10 years ago. Review of Systems   Review of Systems   Constitutional:  Negative for chills and fever. Respiratory:  Negative for shortness of breath. Cardiovascular:  Negative for chest pain.    Musculoskeletal:  Positive for back pain. Negative for arthralgias and joint swelling. Skin:  Negative for rash and wound. Neurological:  Negative for numbness. Current Medications       Current Outpatient Medications:     methocarbamol (ROBAXIN) 500 mg tablet, Take 1 tablet (500 mg total) by mouth 3 (three) times a day, Disp: 21 tablet, Rfl: 0    naproxen (Naprosyn) 500 mg tablet, Take 1 tablet (500 mg total) by mouth 2 (two) times a day with meals, Disp: 30 tablet, Rfl: 0    norethindrone-ethinyl estradiol-ferrous fumarate (Junel Fe 24) 1-20 MG-MCG(24) per tablet, Take 1 tablet by mouth daily, Disp: 84 tablet, Rfl: 3    ondansetron (ZOFRAN-ODT) 4 mg disintegrating tablet, TAKE 2 TABS BY MOUTH TWICE DAILY AS NEEDED FOR NAUSEA AND VOMITTING, Disp: , Rfl:     Current Allergies     Allergies as of 12/11/2023 - Reviewed 12/11/2023   Allergen Reaction Noted    Latex  10/27/2017    Penicillins  10/27/2017            The following portions of the patient's history were reviewed and updated as appropriate: allergies, current medications, past family history, past medical history, past social history, past surgical history and problem list.     Past Medical History:   Diagnosis Date    Depression     Pott's disease        History reviewed. No pertinent surgical history. Family History   Problem Relation Age of Onset    No Known Problems Mother     Asthma Father     Hypertension Father     Ovarian cancer Maternal Grandmother     Lung cancer Paternal Grandfather     Asthma Brother     Arthritis Family     Osteoporosis Family     No Known Problems Maternal Aunt          Medications have been verified. Objective   BP 99/58   Pulse 89   Temp 98.6 °F (37 °C)   Resp 16   SpO2 98%        Physical Exam     Physical Exam  Vitals reviewed. Constitutional:       General: She is not in acute distress. Appearance: Normal appearance. She is normal weight. She is not ill-appearing. HENT:      Head: Normocephalic.       Nose: Nose normal. Mouth/Throat:      Mouth: Mucous membranes are moist.   Eyes:      Conjunctiva/sclera: Conjunctivae normal.   Cardiovascular:      Rate and Rhythm: Normal rate and regular rhythm. Pulses: Normal pulses. Heart sounds: Normal heart sounds. No murmur heard. No friction rub. No gallop. Pulmonary:      Effort: Pulmonary effort is normal.      Breath sounds: Normal breath sounds. No wheezing, rhonchi or rales. Musculoskeletal:      Right shoulder: Normal.      Left shoulder: Normal.      Cervical back: Normal, normal range of motion and neck supple. No tenderness. Thoracic back: Spasms (left sided paraspinous) and tenderness (left sided paraspinous and left rhomboids) present. No swelling, edema, lacerations or bony tenderness. Normal range of motion. Lumbar back: Normal.   Lymphadenopathy:      Cervical: No cervical adenopathy. Skin:     General: Skin is warm. Findings: No rash. Neurological:      Mental Status: She is alert.    Psychiatric:         Mood and Affect: Mood normal.         Behavior: Behavior normal.

## 2023-12-11 NOTE — PATIENT INSTRUCTIONS
Start naproxen as prescribed  Start robaxin as prescribed  Ice 20 minutes 3-4 times per day  Insulate the skin from the ice to prevent frostbite  Follow up with orthopedic if symptoms do not improve  Follow up with PCP in 3-5 days. Proceed to ER if symptoms worsen.

## 2023-12-19 ENCOUNTER — OFFICE VISIT (OUTPATIENT)
Dept: URGENT CARE | Facility: CLINIC | Age: 23
End: 2023-12-19
Payer: COMMERCIAL

## 2023-12-19 VITALS
SYSTOLIC BLOOD PRESSURE: 104 MMHG | OXYGEN SATURATION: 98 % | RESPIRATION RATE: 16 BRPM | DIASTOLIC BLOOD PRESSURE: 62 MMHG | TEMPERATURE: 98.9 F | HEART RATE: 107 BPM

## 2023-12-19 DIAGNOSIS — B34.9 ACUTE VIRAL SYNDROME: Primary | ICD-10-CM

## 2023-12-19 PROCEDURE — 87636 SARSCOV2 & INF A&B AMP PRB: CPT | Performed by: PHYSICIAN ASSISTANT

## 2023-12-19 PROCEDURE — 99213 OFFICE O/P EST LOW 20 MIN: CPT | Performed by: PHYSICIAN ASSISTANT

## 2023-12-19 NOTE — PROGRESS NOTES
[facility]    NAME: Abby Kay is a 23 y.o. female  : 2000    MRN: [id number]  DATE: 2023  TIME: 4:53 PM    Assessment and Plan   Acute viral syndrome [B34.9]  1. Acute viral syndrome  Covid/Flu- Office Collect Normal          Patient Instructions     Patient Instructions   COVID/influenza testing initiated.  Those results take approximately 24 to 48 hours to return.  You may access those results on your [facility]'s [employer] account.    Note given for work.    There are a number of viral respiratory illnesses that can present similarly.  Most are self-limiting.  Antibiotics do not help viral illnesses.       Most upper respiratory symptoms start to improve after 7-12 days but may take a few weeks to completely resolve.        As with any respiratory illness, transmission precautions  are strongly advised.  Masking.  Isolating.  Hand washing.  Frequent cleaning of common use surfaces.      Follow up with your PCP office if not improving over next 7-10 days.  If you want to be proactive, you may contact your PCP office now to schedule follow up for near future.    If you feel like your are severely worsening or have significant weakness, chest pain, shortness of breath proceed to ER for immediate further evaluation.  ---------------------------------------------------------------------------------------------------------------------------------------------------------------------------------------------------------------------------------------------------------------    Strongly encourage getting plenty of rest over the next few days.  Increase your hydration.    Vaporizer by bedside may also be helpful.        Symptom Relief Suggestions:    Options for sore throat or hoarseness:              * Warm salt water gargles every 1-2 hours while awake        * Throat lozenges, Tylenol, voice rest, warm tea with honey.    Options for sinus pressure, nasal congestion, runny nose, and / or  post nasal drip:            * Clearing your sinuses in a nice steamy shower may be helpful, especially first thing after waking.       * Nasal saline rinses every 1-2 hours while awake may also help decrease nasal congestion, drainage.       * Afrin nasal spray if significant nasal congestion at bedtime may use .  (Do not use for over 3 days however.)       * Decongestant / expectorant such as Mucinex D 12 hour 1/2 to 1 tablet as needed with full glass of fluids may help decrease pressure and drainage.    Options for ear pressure, discomfort:         * Decongestant may be helpful.       * Flonase nasal spray.       * Warm compresses against ear(s) for comfort.    Options for help with  cough:         * Warm tea with honey or a teaspoon of honey periodically throughout day and / or before bed.       * Plain Mucinex (an expectorant to help keep mucus thin so you can clear it easier) or Mucinex DM (expectorant / cough suppressant) to help decrease cough if it is bothering your sleep.        Other night time cough medication options include Delsym, Robitussin DM, NyQuil.         * Propping with an extra pillow or two may be helpful.       * Keep water by your bedside to sip on as needed.       * Cough drops.       * Vaporizer by bedside.       * Getting in steamy shower or bathroom.  Cool air may also soothe coughing spasm.      ----------------------------------------------------------------------------------------------------------------------------------------------------------------------------------------------------------------------------------------------------------------      Although bothersome, mucous is not necessarily a bad thing.  Production of mucous is the body's way of trying to capture and flush irritants from mucosal surfaces.  Yellow or green mucous does not necessarily mean you have a bacterial infection.   Mucous will become more discolored over time, especially first thing in the morning, as your  body's immune system  floods the mucosal surfaces with white bloods cells to try and help fight  infection.  This white blood cell debride can also cause mucous to be discolored.  Again, using nasal saline spray frequently may help soothe and keep mucous flowing out versus getting dried, thickened and / or stuck leading to more sinus pain and pressure.     If you have a cough, please realize that a cough is not necessarily a bad thing.  It is a part of your body's protection mechanism to help keep your airways clear.  Phlegm may be more discolored in the morning.  Please note that discolored phlegm does not necessarily mean a bacterial infection.                     Chief Complaint     Chief Complaint   Patient presents with    Cold Like Symptoms     Patient with c/o runny nose and body aches. Boyfriend had COVID and he is better already       History of Present Illness   Abby Kay presents to the clinic c/o  23-year-old female comes in with runny nose and body aches and pains.    Started: This morning noted headache when she woke up and then has started with more body aches and pains.  Associated signs and symptoms: Feverish and chills, fatigue, hoarse, sore throat, some nasal congestion and drainage.  Minimal cough.  Modifying factors:  Known Exposures: Boyfriend COVID 2 weeks ago.  Recent travel:  No.  Hx asthma:  No.  Hx pneumonia:  No.  Smoker: No.    At home COVID test and that was negative.    Patient's 97-year-old grandmother passed away yesterday.  Family trying to figure out  plans.  Concerned about possible contagiousness if going to be around a lot of people.        Review of Systems   Review of Systems   Constitutional:  Positive for activity change, appetite change, chills, fatigue and fever. Negative for diaphoresis.   HENT:  Positive for congestion, postnasal drip, rhinorrhea, sinus pressure and sore throat. Negative for ear discharge, ear pain and sinus pain.    Eyes: Negative.     Respiratory:  Negative for cough, chest tightness, shortness of breath and wheezing.    Cardiovascular: Negative.    Hematological: Negative.        Current Medications     Long-Term Medications   Medication Sig Dispense Refill    norethindrone-ethinyl estradiol-ferrous fumarate (Junel Fe 24) 1-20 MG-MCG(24) per tablet Take 1 tablet by mouth daily 84 tablet 3    methocarbamol (ROBAXIN) 500 mg tablet Take 1 tablet (500 mg total) by mouth 3 (three) times a day (Patient not taking: Reported on 12/19/2023) 21 tablet 0    naproxen (Naprosyn) 500 mg tablet Take 1 tablet (500 mg total) by mouth 2 (two) times a day with meals (Patient not taking: Reported on 12/19/2023) 30 tablet 0    ondansetron (ZOFRAN-ODT) 4 mg disintegrating tablet TAKE 2 TABS BY MOUTH TWICE DAILY AS NEEDED FOR NAUSEA AND VOMITTING (Patient not taking: Reported on 12/19/2023)         Current Allergies     Allergies as of 12/19/2023 - Reviewed 12/19/2023   Allergen Reaction Noted    Latex  10/27/2017    Penicillins  10/27/2017          The following portions of the patient's history were reviewed and updated as appropriate: allergies, current medications, past family history, past medical history, past social history, past surgical history and problem list.  Past Medical History:   Diagnosis Date    Depression     Pott's disease      History reviewed. No pertinent surgical history.  Family History   Problem Relation Age of Onset    No Known Problems Mother     Asthma Father     Hypertension Father     Ovarian cancer Maternal Grandmother     Lung cancer Paternal Grandfather     Asthma Brother     Arthritis Family     Osteoporosis Family     No Known Problems Maternal Aunt        Objective   /62   Pulse (!) 107   Temp 98.9 °F (37.2 °C) (Tympanic)   Resp 16   LMP 12/05/2023 (Approximate)   SpO2 98%   Patient's last menstrual period was 12/05/2023 (approximate).       Physical Exam     Physical Exam  Vitals and nursing note reviewed.    Constitutional:       General: She is not in acute distress.     Appearance: She is well-developed. She is ill-appearing. She is not toxic-appearing or diaphoretic.      Comments: Appears mildly ill but in no acute distress.  No trismus or conversational dyspnea.   HENT:      Head: Normocephalic and atraumatic.      Right Ear: Tympanic membrane, ear canal and external ear normal.      Left Ear: Tympanic membrane, ear canal and external ear normal.      Nose: Congestion and rhinorrhea present.      Mouth/Throat:      Mouth: Mucous membranes are moist.      Pharynx: Posterior oropharyngeal erythema present. No oropharyngeal exudate.      Comments: Cobblestoning posterior pharynx with patchy redness  Eyes:      General:         Right eye: No discharge.         Left eye: No discharge.      Conjunctiva/sclera: Conjunctivae normal.      Pupils: Pupils are equal, round, and reactive to light.   Cardiovascular:      Rate and Rhythm: Regular rhythm. Tachycardia present.      Heart sounds: Normal heart sounds. No murmur heard.     No friction rub. No gallop.   Pulmonary:      Effort: Pulmonary effort is normal. No respiratory distress.      Breath sounds: Normal breath sounds. No stridor. No wheezing, rhonchi or rales.   Musculoskeletal:      Cervical back: Normal range of motion and neck supple. No rigidity or tenderness.   Lymphadenopathy:      Cervical: No cervical adenopathy.   Skin:     General: Skin is warm and dry.      Coloration: Skin is not jaundiced or pale.      Findings: No rash.   Neurological:      Mental Status: She is alert and oriented to person, place, and time.   Psychiatric:         Mood and Affect: Mood normal.         Behavior: Behavior normal.

## 2023-12-19 NOTE — PATIENT INSTRUCTIONS
COVID/influenza testing initiated.  Those results take approximately 24 to 48 hours to return.  You may access those results on your [facility] account.    Note given for work.    There are a number of viral respiratory illnesses that can present similarly.  Most are self-limiting.  Antibiotics do not help viral illnesses.       Most upper respiratory symptoms start to improve after 7-12 days but may take a few weeks to completely resolve.        As with any respiratory illness, transmission precautions  are strongly advised.  Masking.  Isolating.  Hand washing.  Frequent cleaning of common use surfaces.      Follow up with your PCP office if not improving over next 7-10 days.  If you want to be proactive, you may contact your PCP office now to schedule follow up for near future.    If you feel like your are severely worsening or have significant weakness, chest pain, shortness of breath proceed to ER for immediate further evaluation.  ---------------------------------------------------------------------------------------------------------------------------------------------------------------------------------------------------------------------------------------------------------------    Strongly encourage getting plenty of rest over the next few days.  Increase your hydration.    Vaporizer by bedside may also be helpful.        Symptom Relief Suggestions:    Options for sore throat or hoarseness:              * Warm salt water gargles every 1-2 hours while awake        * Throat lozenges, Tylenol, voice rest, warm tea with honey.    Options for sinus pressure, nasal congestion, runny nose, and / or post nasal drip:            * Clearing your sinuses in a nice steamy shower may be helpful, especially first thing after waking.       * Nasal saline rinses every 1-2 hours while awake may also help decrease nasal congestion, drainage.       * Afrin nasal spray if significant nasal congestion at bedtime may use  .  (Do not use for over 3 days however.)       * Decongestant / expectorant such as Mucinex D 12 hour 1/2 to 1 tablet as needed with full glass of fluids may help decrease pressure and drainage.    Options for ear pressure, discomfort:         * Decongestant may be helpful.       * Flonase nasal spray.       * Warm compresses against ear(s) for comfort.    Options for help with  cough:         * Warm tea with honey or a teaspoon of honey periodically throughout day and / or before bed.       * Plain Mucinex (an expectorant to help keep mucus thin so you can clear it easier) or Mucinex DM (expectorant / cough suppressant) to help decrease cough if it is bothering your sleep.        Other night time cough medication options include Delsym, Robitussin DM, NyQuil.         * Propping with an extra pillow or two may be helpful.       * Keep water by your bedside to sip on as needed.       * Cough drops.       * Vaporizer by bedside.       * Getting in steamy shower or bathroom.  Cool air may also soothe coughing spasm.      ----------------------------------------------------------------------------------------------------------------------------------------------------------------------------------------------------------------------------------------------------------------      Although bothersome, mucous is not necessarily a bad thing.  Production of mucous is the body's way of trying to capture and flush irritants from mucosal surfaces.  Yellow or green mucous does not necessarily mean you have a bacterial infection.   Mucous will become more discolored over time, especially first thing in the morning, as your body's immune system  floods the mucosal surfaces with white bloods cells to try and help fight  infection.  This white blood cell debride can also cause mucous to be discolored.  Again, using nasal saline spray frequently may help soothe and keep mucous flowing out versus getting dried, thickened and / or  stuck leading to more sinus pain and pressure.     If you have a cough, please realize that a cough is not necessarily a bad thing.  It is a part of your body's protection mechanism to help keep your airways clear.  Phlegm may be more discolored in the morning.  Please note that discolored phlegm does not necessarily mean a bacterial infection.

## 2023-12-19 NOTE — LETTER
December 19, 2023     Patient: Abby Kay   YOB: 2000   Date of Visit: 12/19/2023       To Whom It May Concern:    Patient seen in office today for acute illness.  No work until without fever for 24 hours without having to take anti fever medication.          Sincerely,        Alisha Duran PA-C    CC: No Recipients

## 2023-12-20 ENCOUNTER — TELEPHONE (OUTPATIENT)
Dept: URGENT CARE | Facility: CLINIC | Age: 23
End: 2023-12-20

## 2023-12-20 LAB
FLUAV RNA RESP QL NAA+PROBE: NEGATIVE
FLUBV RNA RESP QL NAA+PROBE: NEGATIVE
SARS-COV-2 RNA RESP QL NAA+PROBE: POSITIVE

## 2024-06-06 NOTE — CONSULTS
Assessment  1  POTS (postural orthostatic tachycardia syndrome) (427 89) (R00 0,I95 1)   2  Autonomic dysfunction (337 9) (G90 9)   3  Injuries (959 9) (T14 90XA)    Discussion/Summary    1   Significant autonomic dysfunction on tilt-table study  meets criteria for postural orthostatic tachycardia syndrome -1st 10 minutes of studyalso has significant orthostatic hypotension in the later part of the study    does have symptoms affecting multiple other than systems of the bodyof the worsening happened after she had a severe bout of mononucleosis in the spring of 2017has been a gradual decline in physical activity over the last 6 monthsmeets the temporal profile and natural history of POTS    far as postural orthostatic tachycardia syndrome is concerned: : Headache, difficulty to concentrate, occasional chest discomfort, palpitations, presyncope, syncope, feeling dazed, generalized fatigue, poor sleep, daytime sleepinesshad a very detailed discussion - My recommendation for this patient are as follows  intake  Patient should be drinking about 3 liters of water in a day  intake  At leas half of the water intake can be something like Gatorade or Powerade    This is the most important part of the therapy  Gagandeep protocol is recommended  This involves both aerobic and strength training  Please set up appointment with one of our trainers  Patient can learn the specific exercises and follow up on a monthly basis for monitoring progress    -psychotropic medication  Only SSRI can be used  SNRI and NRI will worsen symptoms  pills  Avoid contraceptive pills that contain drosperinone  stockings  Use graded compression stockings  20-30 millimeters of mercury  Bilateral      Considering her baseline heart rate is low, we may either not use it or start at a very low doesstart 5 mg 2 times dailysee if this gives patient some relief from her palpitation    It has to be non selective beta-blocker like propranolol  It has to be in the [FreeTextEntry1] : 23y/o female w/ left cervical lymphadenopathy that has been going on for 3 months and she notes is enlarging: - concerning for lymphoproliferative disorder -Core lymph node biopsy ordered with flow also -will call pt with results -Blood work ordered - likely heme-onc appt will be arranged once diagnosis confirmed lowest possible dose, as high does result in loss of therapeutic effect    she developed significant orthostatic hypotension in the later part of the studystart the patient on low-dose fludrocortisonestart at 0 1 mg dailytherapy will have the maximum benefit especially with strengthening of gastrocnemius and soleus muscleneeded to titrate will do so very slowly    medications that are available include desmopressin, midodrine, modafinil  At the current time we should wait before considering any of the        Bone fractures, joint laxityhas the same since young ageis a very common association with Potspatient starts feeling better, will refer to Dr Fitzpatrick's laboratory in [address] to look for underlying disease as cause of same        Family history of Shantel's diseaseis from the patient's father's sideis in his 45s and may be starting to have early signsto the patient's mother, she has not been made aware of the condition  current condition is unlikely to be related to Shantel's diseaseearly onset Sutter's is a well-defined entity, the patient does not have any chorea or other signs of the same  temporal profile of her illness is very suggestive of POTS  family is going to have a detailed discussion, and discuss it with their neurologist        Headachepatient is undergoing expert evaluation and treatment as per Dr Marie Packer      total of 60 minutes was spent in reviewing all the reports, discussing with the patienttherapy is going to be significant for resolution of autonomic symptoms     Chief Complaint  Significant autonomic dysfunction   Patient seen in consult by Dr Audra Galeas following positive tilt table stress test at [address]  Patient did have significant drop in HR and BP  Per Dr Audra Galeas, patient to start Fludrocortisone, Rx given for compression stockings, referral to PT for POTs with Richardson Mclaughlin        History of Present Illness  Cardiology HPI Free Text Note Form [facility]: The patient was evaluated by me for significant autonomic dysfunction during a tilt-table studydoes have a history of headache and lightheadednesswas seen by our headache specialist Dr Carolann Curling, and was referred for a tilt-table study  is a very active young lady who used to play lacrosse, cross-country running and swimmingdid have infectious mononucleosis earlier in the year 2017detailed questioning there has been a gradual decline in activity since the spring when she had this conditionwas severe and she was mostly bedridden for almost a month  does have multiple symptomsdifficulty to concentrate, generalized fatigue, episodes of palpitation, episodes of lightheadedness and presyncope, feeling dazed, sensation of bloating from time to time,  is not complaining of anginal like chest pain or pressureis not complaining of worsening orthopnea or paroxysmal nocturnal dyspnea  patient has a strong history of [place]'s cornea in her father's side of the familyfather is in his 40smother informed that they have not discussed with the the family history of Shantel's disease  also has a history of increased joint flexibility, easy fractures,has history of multiple such bony injuries since childhood  the tilt study today the patient did become tachycardia very early ondeveloped significant orthostatic hypotension around 30 minutes into the studybecame pale lightheaded and had to be helped to the bed      Review of Systems  As described in detail in my history of present illness  All other systems reviewed and are negative        Active Problems  1  Arthralgia of knee, left (719 46) (M25 562)   2  Chiari malformation   3  Exertional headache (784 0) (G44 84)   4  Left ankle pain (719 47) (M25 572)   5  Lightheadedness (780 4) (R42)   6  Low back pain (724 2) (M54 5)   7  Lumbago (724 2) (M54 5)   8  Lumbosacral disc herniation (722 10) (M51 27)   9  Lumbosacral radiculopathy (724 4) (M54 17)   10   POTS (postural orthostatic tachycardia syndrome) (427 89) (R00 0,I95 1)   11  Sprain of ankle, left (845 00) (D86 166V)    Past Medical History    The active problems and past medical history were reviewed and updated today  Surgical History    The surgical history was reviewed and updated today  Family History  Mother    · Family history of migraine headaches (V17 2) (Z82 0)  Father    · Family history of hypertension (V17 49) (Z82 49)  Paternal Grandfather    · Family history of    · Family history of lung cancer (V16 1) (Z80 1)  Family History    · Family history of Arthritis   · Family history of Osteoporosis  Family History Reviewed: The family history was reviewed and updated today  Social History   · Caffeine use ( 89) (F15 90)   · Lives with family   · Never a smoker   · No alcohol use   · No illicit drug use   · Non smoker / tobacco user ( 89) (Z78 9)   · Single  The social history was reviewed and updated today  Current Meds   1  Fludrocortisone Acetate 0 1 MG Oral Tablet; Take 1 tablet daily; Therapy: [id number] to (Evaluate:2018)  Requested for: 44FNQ8787; Last   Rx:2017 Ordered   2  Motrin  MG Oral Tablet; TAKE 3 TABLET Daily PRN; Therapy: (Recorded:2017) to Recorded   3  Verapamil HCl - 40 MG Oral Tablet; 1/2 tablet po bid; Therapy: 47AXQ8942 to (Evaluate:2018)  Requested for: 67PYQ6969; Last   Rx:2017 Ordered    The medication list was reviewed and updated today  Allergies  1  Penicillins    Vitals  Signs   Heart Rate: 72  Systolic: 90  Diastolic: 60    Physical Exam    Constitutional   General appearance: No acute distress, well appearing and well nourished  Eyes   Conjunctiva and Sclera examination: Conjunctiva pink, sclera anicteric  Ears, Nose, Mouth, and Throat - External inspection of ears and nose: Normal without deformities or discharge  -- Nasal mucosa, septum, and turbinates: Normal, no edema or discharge  -- Oropharynx: Clear, nares are clear, mucous membranes are moist    Neck   Neck and thyroid: Normal, supple, trachea midline, no thyromegaly  Pulmonary   Auscultation of lungs: Clear to auscultation, no rales, no rhonchi, no wheezing, good air movement  Cardiovascular   Auscultation of heart: Normal rate and rhythm, normal S1 and S2, no murmurs  Carotid pulses: Normal, 2+ bilaterally  Pedal pulses: Normal, 2+ bilaterally  Examination of extremities for edema and/or varicosities: Normal     Chest - No obvious deformity  Abdomen   Abdomen: Non-tender and no distention  Musculoskeletal Gait and station: Normal gait  Skin - Skin and subcutaneous tissue: Normal without rashes or lesions  Skin is warm and well perfused, normal turgor  Neurologic - Facial symmetry is retained, extraocular movements are full and symmetric, head neck down and palate movement is bilateral and symmetric  -- Speech: Normal     Psychiatric - Orientation to person, place, and time: Normal -- Mood and affect: Normal       Results/Data  1  Tilt-table study  -heart rate increase more than 30 within the 1st 10 minutes of standing  Suggestive of postural orthostatic tachycardia syndrome    -orthostatic hypotension  Happened around 30 minutes into the study  Systolic blood pressure fell by more than 30 and diastolic more than 20    EKG reviewed by myself, normal sinus rhythm, normal axis, normal progression of R-wave      Future Appointments    Date/Time Provider Specialty Site   11/24/2017 02:00 PM Karan Jolly MD Neurology [place] [address]     End of Encounter Meds  1  Verapamil HCl - 40 MG Oral Tablet; 1/2 tablet po bid; Therapy: [id number] to (Evaluate:11Mar2018)  Requested for: 43PQT3923; Last   Rx:12Oct2017 Ordered  2  Fludrocortisone Acetate 0 1 MG Oral Tablet; Take 1 tablet daily; Therapy: [id number] to (Evaluate:01Mar2018)  Requested for: [id number]; Last   Rx:01Nov2017 Ordered  3   Motrin  MG Oral Tablet; TAKE 3 TABLET Daily PRN;    Therapy: (Recorded:12Oct2017) to Recorded    Signatures   Electronically signed by : YENNI Mcfarland ; Nov 7 2017  7:26AM EST                       (Author)